# Patient Record
Sex: FEMALE | Race: WHITE | URBAN - METROPOLITAN AREA
[De-identification: names, ages, dates, MRNs, and addresses within clinical notes are randomized per-mention and may not be internally consistent; named-entity substitution may affect disease eponyms.]

---

## 2021-04-14 ENCOUNTER — APPOINTMENT (OUTPATIENT)
Dept: URBAN - METROPOLITAN AREA SURGERY 9 | Age: 66
Setting detail: DERMATOLOGY
End: 2021-04-14

## 2021-04-14 DIAGNOSIS — L40.0 PSORIASIS VULGARIS: ICD-10-CM

## 2021-04-14 DIAGNOSIS — L259 CONTACT DERMATITIS AND OTHER ECZEMA, UNSPECIFIED CAUSE: ICD-10-CM

## 2021-04-14 PROBLEM — L23.9 ALLERGIC CONTACT DERMATITIS, UNSPECIFIED CAUSE: Status: ACTIVE | Noted: 2021-04-14

## 2021-04-14 PROCEDURE — 99203 OFFICE O/P NEW LOW 30 MIN: CPT

## 2021-04-14 PROCEDURE — OTHER PRESCRIPTION: OTHER

## 2021-04-14 PROCEDURE — OTHER COUNSELING: OTHER

## 2021-04-14 RX ORDER — BETAMETHASONE DIPROPIONATE 0.5 MG/G
CREAM, AUGMENTED TOPICAL
Qty: 1 | Refills: 5 | Status: ERX

## 2021-04-14 RX ORDER — HYDROCORTISONE 25 MG/G
CREAM TOPICAL TWICE A DAY
Qty: 1 | Refills: 1 | Status: ERX

## 2021-04-14 RX ORDER — MINOCYCLINE HYDROCHLORIDE 100 MG/1
CAPSULE ORAL
Qty: 20 | Refills: 0 | Status: ERX

## 2021-04-14 ASSESSMENT — LOCATION ZONE DERM
LOCATION ZONE: FACE
LOCATION ZONE: LEG
LOCATION ZONE: LIP

## 2021-04-14 ASSESSMENT — LOCATION SIMPLE DESCRIPTION DERM
LOCATION SIMPLE: LEFT EYEBROW
LOCATION SIMPLE: UPPER LIP
LOCATION SIMPLE: LEFT PRETIBIAL REGION

## 2021-04-14 ASSESSMENT — LOCATION DETAILED DESCRIPTION DERM
LOCATION DETAILED: LEFT LATERAL EYEBROW
LOCATION DETAILED: PHILTRUM
LOCATION DETAILED: LEFT DISTAL PRETIBIAL REGION

## 2021-06-01 ENCOUNTER — APPOINTMENT (OUTPATIENT)
Dept: URBAN - METROPOLITAN AREA SURGERY 9 | Age: 66
Setting detail: DERMATOLOGY
End: 2021-06-01

## 2021-06-01 DIAGNOSIS — Z41.9 ENCOUNTER FOR PROCEDURE FOR PURPOSES OTHER THAN REMEDYING HEALTH STATE, UNSPECIFIED: ICD-10-CM

## 2021-06-01 PROCEDURE — OTHER OTHER: OTHER

## 2021-06-01 PROCEDURE — OTHER COSMETIC CONSULTATION: FILLERS: OTHER

## 2021-06-01 ASSESSMENT — LOCATION ZONE DERM: LOCATION ZONE: FACE

## 2021-06-01 ASSESSMENT — LOCATION SIMPLE DESCRIPTION DERM: LOCATION SIMPLE: LEFT CHEEK

## 2021-06-01 ASSESSMENT — LOCATION DETAILED DESCRIPTION DERM: LOCATION DETAILED: LEFT CENTRAL MALAR CHEEK

## 2021-06-01 NOTE — PROCEDURE: OTHER
Other (Free Text): Voluma-cheeks (1 syringe to start with; consider 2 syringes)\\n\\nRefyne - 1mL to NL folds and vertical creases medial to NL folds\\n\\nRefyne and Defyne (1mL each) to marionette lines/prejowl sulcus\\n\\n$1000, $650, $650, $650 (cost for above treatment); if 2nd syringe of Voluma used, will be $950.\\n\\nInformed pt that above filler treatment will bring improvement to volume loss around mouth/chin area; facelift would bring a greater degree of correction, but pt not interested in facelift procedure at this time.\\n\\Darin the near future, pt will also consider fractionated CO2 laser treatment (pt would do well with this procedure to even out skin tone/texture and improve fine lines/wrinkles).
Render Risk Assessment In Note?: no
Note Text (......Xxx Chief Complaint.): This diagnosis correlates with the
Detail Level: Zone

## 2021-07-08 ENCOUNTER — APPOINTMENT (OUTPATIENT)
Dept: URBAN - METROPOLITAN AREA SURGERY 9 | Age: 66
Setting detail: DERMATOLOGY
End: 2021-07-09

## 2021-07-08 DIAGNOSIS — Z41.9 ENCOUNTER FOR PROCEDURE FOR PURPOSES OTHER THAN REMEDYING HEALTH STATE, UNSPECIFIED: ICD-10-CM

## 2021-07-08 PROCEDURE — OTHER OTHER: OTHER

## 2021-07-08 PROCEDURE — OTHER RESTYLANE REFYNE INJECTION: OTHER

## 2021-07-08 PROCEDURE — OTHER JUVEDERM VOLUMA XC INJECTION: OTHER

## 2021-07-08 PROCEDURE — OTHER RESTYLANE DEFYNE INJECTION: OTHER

## 2021-07-08 NOTE — PROCEDURE: OTHER
Render Risk Assessment In Note?: no
Note Text (......Xxx Chief Complaint.): This diagnosis correlates with the
Other (Free Text): At 2 week filler recheck, May consider injection 0.55mL of voluma to NL folds and into the marionette lines.
Detail Level: Zone

## 2021-07-08 NOTE — PROCEDURE: RESTYLANE REFYNE INJECTION
Price (Use Numbers Only, No Special Characters Or $): 376 Price (Use Numbers Only, No Special Characters Or $): 019

## 2021-07-08 NOTE — PROCEDURE: RESTYLANE DEFYNE INJECTION
Price (Use Numbers Only, No Special Characters Or $): 777 Price (Use Numbers Only, No Special Characters Or $): 462

## 2021-07-22 ENCOUNTER — APPOINTMENT (OUTPATIENT)
Dept: URBAN - METROPOLITAN AREA SURGERY 9 | Age: 66
Setting detail: DERMATOLOGY
End: 2021-07-22

## 2021-07-22 DIAGNOSIS — Z41.9 ENCOUNTER FOR PROCEDURE FOR PURPOSES OTHER THAN REMEDYING HEALTH STATE, UNSPECIFIED: ICD-10-CM

## 2021-07-22 PROCEDURE — OTHER COSMETIC FOLLOW-UP: OTHER

## 2021-07-22 PROCEDURE — OTHER OTHER: OTHER

## 2021-07-22 PROCEDURE — OTHER COSMETIC CONSULTATION: FILLERS: OTHER

## 2021-07-22 PROCEDURE — OTHER COSMETIC CONSULTATION: LASER RESURFACING: OTHER

## 2021-07-22 ASSESSMENT — LOCATION SIMPLE DESCRIPTION DERM
LOCATION SIMPLE: RIGHT LIP
LOCATION SIMPLE: LEFT CHEEK
LOCATION SIMPLE: SUPERIOR FOREHEAD

## 2021-07-22 ASSESSMENT — LOCATION DETAILED DESCRIPTION DERM
LOCATION DETAILED: LEFT CENTRAL MALAR CHEEK
LOCATION DETAILED: SUPERIOR MID FOREHEAD
LOCATION DETAILED: RIGHT INFERIOR VERMILION LIP

## 2021-07-22 ASSESSMENT — LOCATION ZONE DERM
LOCATION ZONE: LIP
LOCATION ZONE: FACE

## 2021-07-22 NOTE — PROCEDURE: COSMETIC FOLLOW-UP
Side Effects Or Complications: None
Global Improvement: Marked
Treatment (Optional): Filler Injection
Patient Satisfaction: Very pleased
Detail Level: Zone

## 2021-07-22 NOTE — PROCEDURE: OTHER
Render Risk Assessment In Note?: no
Detail Level: Simple
Note Text (......Xxx Chief Complaint.): This diagnosis correlates with the
Other (Free Text): Pt will call when she is ready to schedule full face treatment. Pt was given Carrie’s extension.\\n\\nDouble stack to perioral region.
Detail Level: Zone
Other (Free Text): Discussed using 1 syringe of RHA-2 to lips

## 2021-09-21 ENCOUNTER — APPOINTMENT (OUTPATIENT)
Dept: URBAN - METROPOLITAN AREA SURGERY 9 | Age: 66
Setting detail: DERMATOLOGY
End: 2021-09-21

## 2021-09-21 DIAGNOSIS — Z41.9 ENCOUNTER FOR PROCEDURE FOR PURPOSES OTHER THAN REMEDYING HEALTH STATE, UNSPECIFIED: ICD-10-CM

## 2021-09-21 PROCEDURE — OTHER FILLERS: OTHER

## 2021-09-21 ASSESSMENT — LOCATION ZONE DERM: LOCATION ZONE: LIP

## 2021-09-21 ASSESSMENT — LOCATION DETAILED DESCRIPTION DERM: LOCATION DETAILED: LEFT SUPERIOR VERMILION LIP

## 2021-09-21 ASSESSMENT — LOCATION SIMPLE DESCRIPTION DERM: LOCATION SIMPLE: LEFT LIP

## 2021-09-21 NOTE — PROCEDURE: FILLERS
Price (Use Numbers Only, No Special Characters Or $): 213 Price (Use Numbers Only, No Special Characters Or $): 030

## 2021-09-21 NOTE — PROCEDURE: FILLERS
Consent obtained. Risks include but not limited to bruising, beading, irregular texture, ulceration, infection, allergic reaction, scar formation, incomplete augmentation, temporary nature, procedural pain.